# Patient Record
Sex: FEMALE | Race: WHITE | URBAN - METROPOLITAN AREA
[De-identification: names, ages, dates, MRNs, and addresses within clinical notes are randomized per-mention and may not be internally consistent; named-entity substitution may affect disease eponyms.]

---

## 2019-02-05 ENCOUNTER — OFFICE VISIT (OUTPATIENT)
Dept: URBAN - METROPOLITAN AREA CLINIC 23 | Facility: CLINIC | Age: 54
End: 2019-02-05
Payer: COMMERCIAL

## 2019-02-05 DIAGNOSIS — E11.9 TYPE 2 DIABETES MELLITUS WITHOUT COMPLICATIONS: Primary | ICD-10-CM

## 2019-02-05 PROCEDURE — 92004 COMPRE OPH EXAM NEW PT 1/>: CPT | Performed by: OPTOMETRIST

## 2019-02-05 PROCEDURE — 92134 CPTRZ OPH DX IMG PST SGM RTA: CPT | Performed by: OPTOMETRIST

## 2019-02-05 ASSESSMENT — KERATOMETRY
OS: 44.13
OD: 44.75

## 2019-02-05 ASSESSMENT — INTRAOCULAR PRESSURE
OD: 14
OS: 16

## 2019-02-05 NOTE — IMPRESSION/PLAN
Impression: Type 2 diabetes mellitus without complications: S66.7. Plan: Discussed diagnosis in detail with patient. Advised and emphasized patient of blood sugar control. Discussed risks of progression. Poor compliance can lead to blindness. OCT macula performed and reviewed with patient today- no signs of diabetic retinopathy. Reassured patient of condition and treatment. Will continue to observe condition and or symptoms.

## 2019-02-05 NOTE — IMPRESSION/PLAN
Impression: Puckering of macula, left eye: H35.372. Plan: Discussed diagnosis in detail with patient. No treatment is required at this time. Reassured patient of current condition and treatment. Will continue to observe condition and or symptoms.

## 2020-03-05 ENCOUNTER — OFFICE VISIT (OUTPATIENT)
Dept: URBAN - METROPOLITAN AREA CLINIC 23 | Facility: CLINIC | Age: 55
End: 2020-03-05
Payer: COMMERCIAL

## 2020-03-05 DIAGNOSIS — H35.372 PUCKERING OF MACULA, LEFT EYE: ICD-10-CM

## 2020-03-05 PROCEDURE — 92134 CPTRZ OPH DX IMG PST SGM RTA: CPT | Performed by: OPTOMETRIST

## 2020-03-05 PROCEDURE — 92014 COMPRE OPH EXAM EST PT 1/>: CPT | Performed by: OPTOMETRIST

## 2020-03-05 ASSESSMENT — INTRAOCULAR PRESSURE
OS: 16
OD: 15

## 2020-03-05 ASSESSMENT — KERATOMETRY
OS: 44.25
OD: 44.13

## 2020-03-05 NOTE — IMPRESSION/PLAN
Impression: Type 2 diabetes mellitus without complications: H58.8. Plan: Discussed diagnosis in detail with patient. Advised and emphasized patient of blood sugar control. Discussed risks of progression. Poor compliance can lead to blindness. OCT macula performed and reviewed with patient today- no signs of diabetic retinopathy. Reassured patient of condition and treatment. Will continue to observe condition and or symptoms.

## 2021-07-07 ENCOUNTER — OFFICE VISIT (OUTPATIENT)
Dept: URBAN - METROPOLITAN AREA CLINIC 40 | Facility: CLINIC | Age: 56
End: 2021-07-07
Payer: COMMERCIAL

## 2021-07-07 DIAGNOSIS — H25.13 AGE-RELATED NUCLEAR CATARACT, BILATERAL: ICD-10-CM

## 2021-07-07 DIAGNOSIS — H04.123 DRY EYE SYNDROME OF BILATERAL LACRIMAL GLANDS: ICD-10-CM

## 2021-07-07 PROCEDURE — 99214 OFFICE O/P EST MOD 30 MIN: CPT | Performed by: OPTOMETRIST

## 2021-07-07 ASSESSMENT — INTRAOCULAR PRESSURE
OD: 14
OS: 16

## 2021-07-07 ASSESSMENT — KERATOMETRY
OD: 44.13
OS: 44.13

## 2021-07-07 NOTE — IMPRESSION/PLAN
Impression: Type 2 diabetes mellitus without complications: M89.5. Plan: Diabetes type II: no background retinopathy, no signs of neovascularization or macular edema noted. Discussed ocular and systemic benefits of blood sugar control. Schedule refraction.

## 2022-07-07 ENCOUNTER — OFFICE VISIT (OUTPATIENT)
Dept: URBAN - METROPOLITAN AREA CLINIC 40 | Facility: CLINIC | Age: 57
End: 2022-07-07
Payer: COMMERCIAL

## 2022-07-07 DIAGNOSIS — H52.4 PRESBYOPIA: ICD-10-CM

## 2022-07-07 DIAGNOSIS — H25.13 AGE-RELATED NUCLEAR CATARACT, BILATERAL: ICD-10-CM

## 2022-07-07 DIAGNOSIS — H04.123 DRY EYE SYNDROME OF BILATERAL LACRIMAL GLANDS: ICD-10-CM

## 2022-07-07 DIAGNOSIS — E11.9 TYPE 2 DIABETES MELLITUS WITHOUT COMPLICATIONS: Primary | ICD-10-CM

## 2022-07-07 PROCEDURE — 99214 OFFICE O/P EST MOD 30 MIN: CPT | Performed by: OPTOMETRIST

## 2022-07-07 ASSESSMENT — INTRAOCULAR PRESSURE
OS: 14
OD: 14

## 2022-07-07 ASSESSMENT — VISUAL ACUITY
OD: 20/25
OS: 20/25

## 2022-07-07 ASSESSMENT — KERATOMETRY
OD: 44.00
OS: 43.88

## 2022-07-07 NOTE — IMPRESSION/PLAN
Impression: Type 2 diabetes mellitus without complications: V80.2. Plan: Diabetes type II: no background retinopathy, no signs of neovascularization or macular edema noted. Discussed ocular and systemic benefits of blood sugar control. Schedule refraction.

## 2023-08-11 ENCOUNTER — OFFICE VISIT (OUTPATIENT)
Dept: URBAN - METROPOLITAN AREA CLINIC 40 | Facility: CLINIC | Age: 58
End: 2023-08-11
Payer: COMMERCIAL

## 2023-08-11 DIAGNOSIS — E11.9 TYPE 2 DIABETES MELLITUS WITHOUT COMPLICATIONS: Primary | ICD-10-CM

## 2023-08-11 DIAGNOSIS — H25.13 AGE-RELATED NUCLEAR CATARACT, BILATERAL: ICD-10-CM

## 2023-08-11 PROCEDURE — 99214 OFFICE O/P EST MOD 30 MIN: CPT | Performed by: OPTOMETRIST

## 2023-08-11 ASSESSMENT — KERATOMETRY
OD: 44.00
OS: 44.13

## 2023-08-11 ASSESSMENT — INTRAOCULAR PRESSURE
OD: 13
OS: 15

## 2024-05-20 ENCOUNTER — OFFICE VISIT (OUTPATIENT)
Dept: URBAN - METROPOLITAN AREA CLINIC 40 | Facility: CLINIC | Age: 59
End: 2024-05-20
Payer: COMMERCIAL

## 2024-05-20 DIAGNOSIS — H04.123 DRY EYE SYNDROME OF BILATERAL LACRIMAL GLANDS: Primary | ICD-10-CM

## 2024-05-20 DIAGNOSIS — H52.4 PRESBYOPIA: ICD-10-CM

## 2024-05-20 PROCEDURE — 99213 OFFICE O/P EST LOW 20 MIN: CPT | Performed by: OPTOMETRIST

## 2024-05-20 ASSESSMENT — VISUAL ACUITY
OS: 20/25
OD: 20/25

## 2024-05-20 ASSESSMENT — KERATOMETRY
OS: 44.00
OD: 44.13

## 2025-01-07 ENCOUNTER — OFFICE VISIT (OUTPATIENT)
Dept: URBAN - METROPOLITAN AREA CLINIC 40 | Facility: CLINIC | Age: 60
End: 2025-01-07
Payer: COMMERCIAL

## 2025-01-07 DIAGNOSIS — E11.9 TYPE 2 DIABETES MELLITUS WITHOUT COMPLICATIONS: Primary | ICD-10-CM

## 2025-01-07 DIAGNOSIS — H35.372 PUCKERING OF MACULA, LEFT EYE: ICD-10-CM

## 2025-01-07 DIAGNOSIS — H25.13 AGE-RELATED NUCLEAR CATARACT, BILATERAL: ICD-10-CM

## 2025-01-07 PROCEDURE — 99214 OFFICE O/P EST MOD 30 MIN: CPT | Performed by: OPTOMETRIST

## 2025-01-07 ASSESSMENT — KERATOMETRY
OD: 43.50
OS: 44.38

## 2025-01-07 ASSESSMENT — INTRAOCULAR PRESSURE
OS: 13
OD: 13